# Patient Record
Sex: FEMALE | Race: WHITE | URBAN - METROPOLITAN AREA
[De-identification: names, ages, dates, MRNs, and addresses within clinical notes are randomized per-mention and may not be internally consistent; named-entity substitution may affect disease eponyms.]

---

## 2024-01-23 ENCOUNTER — OFFICE VISIT (OUTPATIENT)
Dept: OBGYN | Age: 39
End: 2024-01-23
Payer: COMMERCIAL

## 2024-01-23 ENCOUNTER — HOSPITAL ENCOUNTER (OUTPATIENT)
Age: 39
Setting detail: SPECIMEN
Discharge: HOME | End: 2024-01-23
Payer: COMMERCIAL

## 2024-01-23 VITALS
DIASTOLIC BLOOD PRESSURE: 89 MMHG | WEIGHT: 242 LBS | SYSTOLIC BLOOD PRESSURE: 137 MMHG | HEIGHT: 68 IN | BODY MASS INDEX: 36.68 KG/M2

## 2024-01-23 DIAGNOSIS — Z01.419 ENCOUNTER FOR ANNUAL ROUTINE GYNECOLOGICAL EXAMINATION: ICD-10-CM

## 2024-01-23 DIAGNOSIS — Z11.51 ENCOUNTER FOR SCREENING FOR HUMAN PAPILLOMAVIRUS (HPV): ICD-10-CM

## 2024-01-23 DIAGNOSIS — N92.6 IRREGULAR MENSES: Primary | ICD-10-CM

## 2024-01-23 DIAGNOSIS — L68.0 HIRSUTISM: ICD-10-CM

## 2024-01-23 LAB
DEPRECATED RDW RBC AUTO: 12.2 % (ref 12.4–15.4)
HCT VFR BLD AUTO: 44.8 % (ref 36–48)
HGB BLD-MCNC: 15.2 G/DL (ref 12–16)
MCH RBC QN AUTO: 29.2 PG (ref 26–34)
MCHC RBC AUTO-ENTMCNC: 34 G/DL (ref 31–36)
MCV RBC AUTO: 85.9 FL (ref 80–100)
PLATELET # BLD AUTO: 301 K/UL (ref 135–450)
PMV BLD AUTO: 10.2 FL (ref 5–10.5)
RBC # BLD AUTO: 5.21 M/UL (ref 4–5.2)
WBC # BLD AUTO: 9.4 K/UL (ref 4–11)

## 2024-01-23 PROCEDURE — 36415 COLL VENOUS BLD VENIPUNCTURE: CPT | Performed by: OBSTETRICS & GYNECOLOGY

## 2024-01-23 PROCEDURE — 88142 CYTOPATH C/V THIN LAYER: CPT

## 2024-01-23 PROCEDURE — 99385 PREV VISIT NEW AGE 18-39: CPT | Performed by: OBSTETRICS & GYNECOLOGY

## 2024-01-23 PROCEDURE — 87624 HPV HI-RISK TYP POOLED RSLT: CPT

## 2024-01-23 ASSESSMENT — PATIENT HEALTH QUESTIONNAIRE - PHQ9
SUM OF ALL RESPONSES TO PHQ9 QUESTIONS 1 & 2: 0
SUM OF ALL RESPONSES TO PHQ QUESTIONS 1-9: 0
SUM OF ALL RESPONSES TO PHQ QUESTIONS 1-9: 0
1. LITTLE INTEREST OR PLEASURE IN DOING THINGS: 0
SUM OF ALL RESPONSES TO PHQ QUESTIONS 1-9: 0
2. FEELING DOWN, DEPRESSED OR HOPELESS: 0
SUM OF ALL RESPONSES TO PHQ QUESTIONS 1-9: 0

## 2024-01-23 NOTE — PROGRESS NOTES
24    Justine La  1985    Chief Complaint   Patient presents with    New Patient     Annual exam, LMP 24, irregular menses, had tubal ligation, sexually active, pap 2012 negative.   Complains of irregular menses and hirsutism.      Menstrual Problem     Menses not every 28 days sometimes comes before sometimes later than 28 days.         Justine La is a 38 y.o. female who presents today for evaluation of annual exam, hirutism, irreggular menses    Past Medical History:   Diagnosis Date    Hirsutism     Hypertension     Irregular menses     Obesity        Past Surgical History:   Procedure Laterality Date     SECTION      TUBAL LIGATION         Social History     Tobacco Use    Smoking status: Never    Smokeless tobacco: Never   Vaping Use    Vaping Use: Never used   Substance Use Topics    Alcohol use: No    Drug use: No       Family History   Problem Relation Age of Onset    Arthritis Mother     Miscarriages / Stillbirths Mother     High Blood Pressure Father     Heart Disease Maternal Grandmother     High Blood Pressure Maternal Grandmother     High Cholesterol Maternal Grandmother     Heart Disease Maternal Grandfather     High Blood Pressure Maternal Grandfather     High Cholesterol Maternal Grandfather        Current Outpatient Medications   Medication Sig Dispense Refill    Multiple Vitamins-Minerals (MULTIVITAMIN & MINERAL PO) Take  by mouth.         No current facility-administered medications for this visit.       Allergies   Allergen Reactions    Augmentin [Amoxicillin-Pot Clavulanate]            There is no immunization history for the selected administration types on file for this patient.    Review of Systems    /89 (Site: Right Upper Arm, Position: Sitting, Cuff Size: Large Adult)   Ht 1.727 m (5' 8\")   Wt 109.8 kg (242 lb)   LMP 2024 Comment: bps  BMI 36.80 kg/m²     Physical Exam  Exam conducted with a chaperone present.   Constitutional:

## 2024-01-24 LAB
FSH SERPL-ACNC: 3.5 MIU/ML
HCG SERPL QL: NEGATIVE
PROLACTIN SERPL IA-MCNC: 10.7 NG/ML
TSH SERPL DL<=0.005 MIU/L-ACNC: 1.39 UIU/ML (ref 0.27–4.2)

## 2024-01-25 LAB
SHBG SERPL-SCNC: 50 NMOL/L (ref 30–135)
TESTOST FREE SERPL-MCNC: 9.2 PG/ML (ref 1.3–9.2)
TESTOST SERPL-MCNC: 66 NG/DL (ref 20–70)

## 2024-01-27 LAB
HPV HIGH RISK: NOT DETECTED
HPV, GENOTYPE 16: NOT DETECTED
HPV, GENOTYPE 18: NOT DETECTED

## 2024-02-08 ENCOUNTER — OFFICE VISIT (OUTPATIENT)
Dept: OBGYN | Age: 39
End: 2024-02-08
Payer: COMMERCIAL

## 2024-02-08 VITALS
BODY MASS INDEX: 36.22 KG/M2 | DIASTOLIC BLOOD PRESSURE: 83 MMHG | WEIGHT: 239 LBS | SYSTOLIC BLOOD PRESSURE: 136 MMHG | HEIGHT: 68 IN

## 2024-02-08 DIAGNOSIS — E28.2 PCOS (POLYCYSTIC OVARIAN SYNDROME): Primary | ICD-10-CM

## 2024-02-08 PROCEDURE — 99214 OFFICE O/P EST MOD 30 MIN: CPT | Performed by: OBSTETRICS & GYNECOLOGY

## 2024-02-08 RX ORDER — METFORMIN HYDROCHLORIDE 500 MG/1
1500 TABLET, EXTENDED RELEASE ORAL
Qty: 90 TABLET | Refills: 11 | Status: SHIPPED | OUTPATIENT
Start: 2024-02-08 | End: 2025-02-02

## 2024-02-08 RX ORDER — SPIRONOLACTONE 100 MG/1
100 TABLET, FILM COATED ORAL DAILY
Qty: 90 TABLET | Refills: 1 | Status: SHIPPED | OUTPATIENT
Start: 2024-02-08

## 2024-02-08 SDOH — ECONOMIC STABILITY: FOOD INSECURITY: WITHIN THE PAST 12 MONTHS, YOU WORRIED THAT YOUR FOOD WOULD RUN OUT BEFORE YOU GOT MONEY TO BUY MORE.: NEVER TRUE

## 2024-02-08 SDOH — ECONOMIC STABILITY: TRANSPORTATION INSECURITY
IN THE PAST 12 MONTHS, HAS LACK OF TRANSPORTATION KEPT YOU FROM MEETINGS, WORK, OR FROM GETTING THINGS NEEDED FOR DAILY LIVING?: NO

## 2024-02-08 SDOH — ECONOMIC STABILITY: INCOME INSECURITY: HOW HARD IS IT FOR YOU TO PAY FOR THE VERY BASICS LIKE FOOD, HOUSING, MEDICAL CARE, AND HEATING?: NOT HARD AT ALL

## 2024-02-08 SDOH — ECONOMIC STABILITY: FOOD INSECURITY: WITHIN THE PAST 12 MONTHS, THE FOOD YOU BOUGHT JUST DIDN'T LAST AND YOU DIDN'T HAVE MONEY TO GET MORE.: NEVER TRUE

## 2024-02-08 SDOH — ECONOMIC STABILITY: HOUSING INSECURITY
IN THE LAST 12 MONTHS, WAS THERE A TIME WHEN YOU DID NOT HAVE A STEADY PLACE TO SLEEP OR SLEPT IN A SHELTER (INCLUDING NOW)?: NO

## 2024-02-08 NOTE — PROGRESS NOTES
24    Justine La  1985    Chief Complaint   Patient presents with    Irregular Menses     Pt here to discuss ultrasound result for irregular menses.         Justine La is a 38 y.o. female who presents today for evaluation of irregular menses, hirsutism    Past Medical History:   Diagnosis Date    Hirsutism     Hypertension     Irregular menses     Obesity        Past Surgical History:   Procedure Laterality Date     SECTION      TUBAL LIGATION         Social History     Tobacco Use    Smoking status: Never    Smokeless tobacco: Never   Vaping Use    Vaping Use: Never used   Substance Use Topics    Alcohol use: No    Drug use: No       Family History   Problem Relation Age of Onset    Arthritis Mother     Miscarriages / Stillbirths Mother     High Blood Pressure Father     Heart Disease Maternal Grandmother     High Blood Pressure Maternal Grandmother     High Cholesterol Maternal Grandmother     Heart Disease Maternal Grandfather     High Blood Pressure Maternal Grandfather     High Cholesterol Maternal Grandfather        Current Outpatient Medications   Medication Sig Dispense Refill    spironolactone (ALDACTONE) 100 MG tablet Take 1 tablet by mouth daily 90 tablet 1    metFORMIN (GLUCOPHAGE-XR) 500 MG extended release tablet Take 3 tablets by mouth daily (with breakfast) 90 tablet 11    Multiple Vitamins-Minerals (MULTIVITAMIN & MINERAL PO) Take  by mouth.         No current facility-administered medications for this visit.       Allergies   Allergen Reactions    Augmentin [Amoxicillin-Pot Clavulanate]            There is no immunization history for the selected administration types on file for this patient.    Review of Systems  2024 1432 2024 0456 Prolactin [[id number]]   Blood    Component Value Units   Prolactin 10.7  ng/mL          2024 1432 2024 0255 TSH with Reflex to FT4 [[id number]]   Blood    Component Value Units   TSH Reflex FT4 1.39 uIU/mL

## 2024-08-18 DIAGNOSIS — E28.2 PCOS (POLYCYSTIC OVARIAN SYNDROME): ICD-10-CM

## 2024-08-19 RX ORDER — SPIRONOLACTONE 100 MG/1
100 TABLET, FILM COATED ORAL DAILY
Qty: 90 TABLET | Refills: 1 | Status: SHIPPED | OUTPATIENT
Start: 2024-08-19

## 2024-11-19 ENCOUNTER — HOSPITAL ENCOUNTER (OUTPATIENT)
Dept: PHYSICAL THERAPY | Age: 39
Setting detail: THERAPIES SERIES
Discharge: HOME | End: 2024-11-19
Payer: COMMERCIAL

## 2024-11-19 PROCEDURE — 97161 PT EVAL LOW COMPLEX 20 MIN: CPT

## 2024-11-19 PROCEDURE — 97110 THERAPEUTIC EXERCISES: CPT

## 2024-11-19 NOTE — PLAN OF CARE
Outpatient Physical Therapy           [place]           [x] Phone: [phone]   Fax: [phone]  [place]           [] Phone: [phone]   Fax: [phone]     To: Angela Guerrero PA     From: Yuridia Lombardi, PT, DPT, Cert. DN      Patient: Justine La       : 1985  Diagnosis: adhesive capsulitis of right shoulder   Treatment Diagnosis: decreased RUE ROM  Date: 2024    Physical Therapy Certification/Re-Certification Form  Dear KAYLI Mishra   The following patient has been evaluated for physical therapy services and for therapy to continue, insurance requires physician review of the treatment plan initially and every 90 days. Please review the attached evaluation and/or summary of the patient's plan of care, and verify that you agree therapy should continue by signing the attached document and sending it back to our office.    Assessment:    Assessment: Pt is a 40 yo L handed female that presents to therapy with complaints of arm stiffness and decreased ROM in RUE. She did have a flu shot about a week before experiencing her RUE s/s. She has been dealing with these issues for about 4 months. 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 She is also having a hard time with dressing and household tasks, requiring assistance from her family. Presenting s/s suggest adhesive capsulitis from improper flu shot administration. Pt demo impaired RUE ROM/strength, reaching, activity tolerance, functional mobility, work tolerance, ADLS/IADLS and increased pain. Pt would benefit from continued skilled physical therapy to address impairments and limitations, progress toward goal completion, promote independence with ADLs, and prevent further injury.    Patient agrees with established plan of care and assisted in the development of their short term and long term goals. Patient had no adverse reaction with initial treatment and there are no barriers to learning.

## 2024-11-19 NOTE — FLOWSHEET NOTE
Outpatient Physical Therapy  [place]           [x] Phone: [phone]   Fax: [phone]  [place]           [] Phone: [phone]   Fax: [phone]        Physical Therapy Daily Treatment Note  Date:  2024    Patient Name:  Justine La    :  1985  MRN: [id number]  Restrictions/Precautions: Restrictions/Precautions: General Precautions        Diagnosis:  adhesive capsulitis or right shoulder   Date of Injury/Surgery:   Treatment Diagnosis:  decreased RUE ROM  Insurance/Certification information: anthem- 90 visits  Referring Physician:  Angela Guerrero PA     PCP: Scott Fontaine MD  Next Doctor Visit:    Plan of care signed (Y/N):  sent   Outcome Measure: QD: 34%   Visit# / total visits:   1/10  Pain level: 0/10   Goals:     Patient goals: return to PLOF  Short term goals  Time Frame for Short term goals: refer to [facility]                 Long Term Goals  Time Frame for Long Term Goals: 10 visits  Pt will report overall improvement in condition by 50% or more  pt will improve QD to 25% or less to show MDC and subjective improvement for reduced pain with ADLS/IADLS  pt will improve R shoulder flexion AROM to 145 deg for improved reaching  pt will improve R shoulder IR AROM to L3 for improved dressing  pt will improve R shoulder ER AROM to C3 for improved brushing hair      Summary of Evaluation:  Assessment: Pt is a 38 yo L handed female that presents to therapy with complaints of arm stiffness and decreased ROM in RUE. She did have a flu shot about a week before experiencing her RUE s/s. She has been dealing with these issues for about 4 months. 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 She is also having a hard time with dressing and household tasks, requiring assistance from her family. Presenting s/s suggest adhesive capsulitis from improper flu shot administration. Pt demo impaired RUE ROM/strength, reaching, activity tolerance, functional mobility, work

## 2024-11-19 NOTE — PROGRESS NOTES
Physical Therapy: Initial Evaluation    Patient: Justine La (39 y.o. female)   Examination Date: 2024  Plan of Care Certification Period: 2024 to        :  1985 ;    Confirmed: Yes MRN: [id number]  CSN: [id number]   Insurance: Payor: [facility] / Plan: BCBS - [place] HMO / Product Type: *No Product type* /   Insurance ID: [id number] - ([facility]) Secondary Insurance (if applicable):    Referring Physician: Angela Guerrero PA     PCP: Scott Fontaine MD Visits to Date/Visits Approved:   /      No Show/Cancelled Appts:   /       Medical Diagnosis: adhesive capsulitis of right shoulder   Treatment Diagnosis: decreased RUE ROM     PERTINENT MEDICAL HISTORY   Patient Assessed for Rehabilitation Services: Yes       Medical History: Chart Reviewed: Yes   Past Medical History:   Diagnosis Date    Hirsutism     Hypertension     Irregular menses     Obesity      Surgical History:   Past Surgical History:   Procedure Laterality Date     SECTION      TUBAL LIGATION         Medications:   Current Outpatient Medications:     spironolactone (ALDACTONE) 100 MG tablet, TAKE 1 TABLET BY MOUTH EVERY DAY, Disp: 90 tablet, Rfl: 1    metFORMIN (GLUCOPHAGE-XR) 500 MG extended release tablet, Take 3 tablets by mouth daily (with breakfast), Disp: 90 tablet, Rfl: 11    Multiple Vitamins-Minerals (MULTIVITAMIN & MINERAL PO), Take  by mouth.  , Disp: , Rfl:   Allergies: Augmentin [amoxicillin-pot clavulanate]      SUBJECTIVE EXAMINATION      ,           Subjective History:    Subjective: Pt reports that she hasnt been able to lift her arm above her head for about 4 months. She notes that she wasnt able to get bra straps on and then she slept on it and it has been that way ever since. She denies any surgeries on her R shoulder. She did play volleyball in high school and thinks that she injured it previously. She is currently in 0/10 pain, denies any n/t. She has been having trouble dressing and completely household

## 2024-12-04 ENCOUNTER — HOSPITAL ENCOUNTER (OUTPATIENT)
Dept: PHYSICAL THERAPY | Age: 39
Setting detail: THERAPIES SERIES
Discharge: HOME | End: 2024-12-04
Payer: COMMERCIAL

## 2024-12-04 PROCEDURE — 97110 THERAPEUTIC EXERCISES: CPT

## 2024-12-04 PROCEDURE — 97140 MANUAL THERAPY 1/> REGIONS: CPT

## 2024-12-04 NOTE — FLOWSHEET NOTE
Outpatient Physical Therapy  [place]           [x] Phone: [phone]   Fax: [phone]  [place]           [] Phone: [phone]   Fax: [phone]        Physical Therapy Daily Treatment Note  Date:  2024    Patient Name:  Justine La    :  1985  MRN: [id number]  Restrictions/Precautions: Restrictions/Precautions: General Precautions        Diagnosis:  adhesive capsulitis or right shoulder   Date of Injury/Surgery:   Treatment Diagnosis:  decreased RUE ROM  Insurance/Certification information: anthem- 90 visits  Referring Physician:  Angela Guerrero PA     PCP: Scott Fontaine MD  Next Doctor Visit:    Plan of care signed (Y/N):  sent   Outcome Measure: QD: 34%   Visit# / total visits:   2/10  Pain level: 0/10   Goals:     Patient goals: return to PLOF  Short term goals  Time Frame for Short term goals: refer to [facility]                 Long Term Goals  Time Frame for Long Term Goals: 10 visits  Pt will report overall improvement in condition by 50% or more  pt will improve QD to 25% or less to show MDC and subjective improvement for reduced pain with ADLS/IADLS  pt will improve R shoulder flexion AROM to 145 deg for improved reaching  pt will improve R shoulder IR AROM to L3 for improved dressing  pt will improve R shoulder ER AROM to C3 for improved brushing hair      Summary of Evaluation:  Assessment: Pt is a 40 yo L handed female that presents to therapy with complaints of arm stiffness and decreased ROM in RUE. She did have a flu shot about a week before experiencing her RUE s/s. She has been dealing with these issues for about 4 months. 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 She is also having a hard time with dressing and household tasks, requiring assistance from her family. Presenting s/s suggest adhesive capsulitis from improper flu shot administration. Pt demo impaired RUE ROM/strength, reaching, activity tolerance, functional mobility, work

## 2024-12-10 ENCOUNTER — HOSPITAL ENCOUNTER (OUTPATIENT)
Dept: PHYSICAL THERAPY | Age: 39
Setting detail: THERAPIES SERIES
Discharge: HOME | End: 2024-12-10
Payer: COMMERCIAL

## 2024-12-10 PROCEDURE — 97110 THERAPEUTIC EXERCISES: CPT

## 2024-12-10 PROCEDURE — 97140 MANUAL THERAPY 1/> REGIONS: CPT

## 2024-12-10 NOTE — FLOWSHEET NOTE
Outpatient Physical Therapy  [place]           [x] Phone: [phone]   Fax: [phone]  [place]           [] Phone: [phone]   Fax: [phone]        Physical Therapy Daily Treatment Note  Date:  12/10/2024    Patient Name:  Justine La    :  1985  MRN: [id number]  Restrictions/Precautions: Restrictions/Precautions: General Precautions        Diagnosis:  adhesive capsulitis or right shoulder   Date of Injury/Surgery:   Treatment Diagnosis:  decreased RUE ROM  Insurance/Certification information: anthem- 90 visits  Referring Physician:  Angela Guerrero PA     PCP: Scott Fontaine MD  Next Doctor Visit:    Plan of care signed (Y/N):  sent   Outcome Measure: QD: 34%   Visit# / total visits:   3/10  Pain level: 0/10   Goals:     Patient goals: return to PLOF  Short term goals  Time Frame for Short term goals: refer to [facility]       Long Term Goals  Time Frame for Long Term Goals: 10 visits  Pt will report overall improvement in condition by 50% or more  pt will improve QD to 25% or less to show MDC and subjective improvement for reduced pain with ADLS/IADLS  pt will improve R shoulder flexion AROM to 145 deg for improved reaching  pt will improve R shoulder IR AROM to L3 for improved dressing  pt will improve R shoulder ER AROM to C3 for improved brushing hair      Summary of Evaluation:  Assessment: Pt is a 40 yo L handed female that presents to therapy with complaints of arm stiffness and decreased ROM in RUE. She did have a flu shot about a week before experiencing her RUE s/s. She has been dealing with these issues for about 4 months. 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 She is also having a hard time with dressing and household tasks, requiring assistance from her family. Presenting s/s suggest adhesive capsulitis from improper flu shot administration. Pt demo impaired RUE ROM/strength, reaching, activity tolerance, functional mobility, work

## 2024-12-17 ENCOUNTER — HOSPITAL ENCOUNTER (OUTPATIENT)
Dept: PHYSICAL THERAPY | Age: 39
Setting detail: THERAPIES SERIES
Discharge: HOME | End: 2024-12-17
Payer: COMMERCIAL

## 2024-12-17 PROCEDURE — 97140 MANUAL THERAPY 1/> REGIONS: CPT

## 2024-12-17 PROCEDURE — 97110 THERAPEUTIC EXERCISES: CPT

## 2024-12-17 NOTE — FLOWSHEET NOTE
AAROM with Dowel  - 2 x daily - 7 x weekly - 2 sets - 10 reps - 5 hold   - Supine Shoulder Abduction AAROM with Dowel  - 2 x daily - 7 x weekly - 2 sets - 10 reps - 5 hold   - Sidelying Shoulder External Rotation with Dumbbell  - 2 x daily - 7 x weekly - 2 sets - 10 reps   - Standing Shoulder Internal Rotation Stretch with Towel  - 2 x daily - 7 x weekly - 2 sets - 10 reps - 5 hold      12/10: R UT stretch     Manual Treatments:  TPR R UT , PROM in all directions to tolerance, scap mobilty x 20' total     Modalities:  none      Communication with other providers:  none this date       Assessment: Pt tolerated treatment well today.  Pt is making gains with her strength and ROM, but is still very limited with ER and IR. Pt rated pain at 0/10 after treatment and stated that she felt like she had more mobility.         Plan for Next Session:   Specific Instructions for Next Treatment: RUE ROM/strength, manual    Time In / Time Out:   1645/  1730     Timed Code/Total Treatment Minutes:  45'/45' 1 man ( 20') 2 TE (25')       Next Progress Note due:  10th visit       Plan of Care Interventions:  [x] Therapeutic Exercise  [x] Modalities:  [x] Therapeutic Activity     [] Ultrasound  [] Estim  [] Gait Training      [] Cervical Traction [] Lumbar Traction  [x] Neuromuscular Re-education    [] Cold/hotpack [] Iontophoresis   [x] Instruction in HEP      [x] Vasopneumatic   [x] Dry Needling    [x] Manual Therapy               [] Aquatic Therapy              Electronically signed by:Kalie Pierre PTA  , 12/17/2024, 1:05 PM     12/17/2024,6:19 PM

## 2024-12-30 ENCOUNTER — HOSPITAL ENCOUNTER (OUTPATIENT)
Dept: PHYSICAL THERAPY | Age: 39
Setting detail: THERAPIES SERIES
Discharge: HOME | End: 2024-12-30
Payer: COMMERCIAL

## 2024-12-30 PROCEDURE — 97140 MANUAL THERAPY 1/> REGIONS: CPT

## 2024-12-30 PROCEDURE — 97110 THERAPEUTIC EXERCISES: CPT

## 2024-12-30 NOTE — FLOWSHEET NOTE
Outpatient Physical Therapy  [place]           [x] Phone: [phone]   Fax: [phone]  [place]           [] Phone: [phone]   Fax: [phone]        Physical Therapy Daily Treatment Note  Date:  2024    Patient Name:  Justine La    :  1985  MRN: [id number]  Restrictions/Precautions: Restrictions/Precautions: General Precautions        Diagnosis:  adhesive capsulitis or right shoulder   Date of Injury/Surgery:   Treatment Diagnosis:  decreased RUE ROM  Insurance/Certification information: anthem- 90 visits  Referring Physician:  Angela Guerrero PA     PCP: Scott Fontaine MD  Next Doctor Visit:  mid    Plan of care signed (Y/N):  sent   Outcome Measure: QD: 34%   Visit# / total visits:   5/10  Pain level: 0/10   Goals:     Patient goals: return to PLOF  Short term goals  Time Frame for Short term goals: refer to LT       Long Term Goals  Time Frame for Long Term Goals: 10 visits  Pt will report overall improvement in condition by 50% or more  pt will improve QD to 25% or less to show MDC and subjective improvement for reduced pain with ADLS/IADLS  pt will improve R shoulder flexion AROM to 145 deg for improved reaching  pt will improve R shoulder IR AROM to L3 for improved dressing  pt will improve R shoulder ER AROM to C3 for improved brushing hair      Summary of Evaluation:  Assessment: Pt is a 38 yo L handed female that presents to therapy with complaints of arm stiffness and decreased ROM in RUE. She did have a flu shot about a week before experiencing her RUE s/s. She has been dealing with these issues for about 4 months. 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 She is also having a hard time with dressing and household tasks, requiring assistance from her family. Presenting s/s suggest adhesive capsulitis from improper flu shot administration. Pt demo impaired RUE ROM/strength, reaching, activity tolerance, functional mobility, work

## 2025-01-07 ENCOUNTER — HOSPITAL ENCOUNTER (OUTPATIENT)
Dept: PHYSICAL THERAPY | Age: 40
Setting detail: THERAPIES SERIES
Discharge: HOME | End: 2025-01-07
Payer: COMMERCIAL

## 2025-01-07 PROCEDURE — 97140 MANUAL THERAPY 1/> REGIONS: CPT

## 2025-01-07 PROCEDURE — 97110 THERAPEUTIC EXERCISES: CPT

## 2025-01-07 NOTE — FLOWSHEET NOTE
Outpatient Physical Therapy  [place]           [x] Phone: [phone]   Fax: [phone]  [place]           [] Phone: [phone]   Fax: [phone]        Physical Therapy Daily Treatment Note  Date:  2025    Patient Name:  Justine La    :  1985  MRN: [id number]  Restrictions/Precautions: Restrictions/Precautions: General Precautions        Diagnosis:  adhesive capsulitis or right shoulder   Date of Injury/Surgery:   Treatment Diagnosis:  decreased RUE ROM  Insurance/Certification information: anthem- 90 visits  Referring Physician:  Angela Guerrero PA     PCP: Scott Fontaine MD  Next Doctor Visit:  mid    Plan of care signed (Y/N):  sent   Outcome Measure: QD: 34%   Visit# / total visits:   6/10  Pain level: 3/10   Goals:     Patient goals: return to PLOF  Short term goals  Time Frame for Short term goals: refer to LT       Long Term Goals  Time Frame for Long Term Goals: 10 visits  Pt will report overall improvement in condition by 50% or more  pt will improve QD to 25% or less to show MDC and subjective improvement for reduced pain with ADLS/IADLS  pt will improve R shoulder flexion AROM to 145 deg for improved reaching  pt will improve R shoulder IR AROM to L3 for improved dressing  pt will improve R shoulder ER AROM to C3 for improved brushing hair      Summary of Evaluation:  Assessment: Pt is a 38 yo L handed female that presents to therapy with complaints of arm stiffness and decreased ROM in RUE. She did have a flu shot about a week before experiencing her RUE s/s. She has been dealing with these issues for about 4 months. 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 She is also having a hard time with dressing and household tasks, requiring assistance from her family. Presenting s/s suggest adhesive capsulitis from improper flu shot administration. Pt demo impaired RUE ROM/strength, reaching, activity tolerance, functional mobility, work

## 2025-01-15 ENCOUNTER — HOSPITAL ENCOUNTER (OUTPATIENT)
Dept: PHYSICAL THERAPY | Age: 40
Setting detail: THERAPIES SERIES
Discharge: HOME | End: 2025-01-15
Payer: COMMERCIAL

## 2025-01-15 PROCEDURE — 97110 THERAPEUTIC EXERCISES: CPT

## 2025-01-15 PROCEDURE — 97140 MANUAL THERAPY 1/> REGIONS: CPT

## 2025-01-21 ENCOUNTER — HOSPITAL ENCOUNTER (OUTPATIENT)
Dept: PHYSICAL THERAPY | Age: 40
Setting detail: THERAPIES SERIES
Discharge: HOME | End: 2025-01-21
Payer: COMMERCIAL

## 2025-01-21 PROCEDURE — 97110 THERAPEUTIC EXERCISES: CPT

## 2025-01-21 PROCEDURE — 97140 MANUAL THERAPY 1/> REGIONS: CPT

## 2025-01-21 NOTE — FLOWSHEET NOTE
Outpatient Physical Therapy  [place]           [x] Phone: [phone]   Fax: [phone]  [place]           [] Phone: [phone]   Fax: [phone]        Physical Therapy Daily Treatment Note  Date:  2025    Patient Name:  Justine La    :  1985  MRN: [id number]  Restrictions/Precautions: Restrictions/Precautions: General Precautions        Diagnosis:  adhesive capsulitis or right shoulder   Date of Injury/Surgery:   Treatment Diagnosis:  decreased RUE ROM  Insurance/Certification information: anthem- 90 visits  Referring Physician:  Angela Guerrero PA     PCP: Scott Fontaine MD  Next Doctor Visit:  mid    Plan of care signed (Y/N):  sent   Outcome Measure: QD: 34%   Visit# / total visits:   8/10  Pain level: 2/10   Goals:     Patient goals: return to PLOF  Short term goals  Time Frame for Short term goals: refer to LT       Long Term Goals  Time Frame for Long Term Goals: 10 visits  Pt will report overall improvement in condition by 50% or more  pt will improve QD to 25% or less to show MDC and subjective improvement for reduced pain with ADLS/IADLS  pt will improve R shoulder flexion AROM to 145 deg for improved reaching  pt will improve R shoulder IR AROM to L3 for improved dressing  pt will improve R shoulder ER AROM to C3 for improved brushing hair      Summary of Evaluation:  Assessment: Pt is a 38 yo L handed female that presents to therapy with complaints of arm stiffness and decreased ROM in RUE. She did have a flu shot about a week before experiencing her RUE s/s. She has been dealing with these issues for about 4 months. 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 She is also having a hard time with dressing and household tasks, requiring assistance from her family. Presenting s/s suggest adhesive capsulitis from improper flu shot administration. Pt demo impaired RUE ROM/strength, reaching, activity tolerance, functional mobility, work

## 2025-01-28 ENCOUNTER — HOSPITAL ENCOUNTER (OUTPATIENT)
Dept: PHYSICAL THERAPY | Age: 40
Setting detail: THERAPIES SERIES
Discharge: HOME | End: 2025-01-28
Payer: COMMERCIAL

## 2025-01-28 PROCEDURE — 97110 THERAPEUTIC EXERCISES: CPT

## 2025-01-28 PROCEDURE — 97140 MANUAL THERAPY 1/> REGIONS: CPT

## 2025-01-28 NOTE — FLOWSHEET NOTE
Outpatient Physical Therapy  [place]           [x] Phone: [phone]   Fax: [phone]  [place]           [] Phone: [phone]   Fax: [phone]        Physical Therapy Daily Treatment Note  Date:  2025    Patient Name:  Justine La    :  1985  MRN: [id number]  Restrictions/Precautions: Restrictions/Precautions: General Precautions        Diagnosis:  adhesive capsulitis or right shoulder   Date of Injury/Surgery:   Treatment Diagnosis:  decreased RUE ROM  Insurance/Certification information: anthem- 90 visits  Referring Physician:  Angela Guerrero PA     PCP: Scott Fontaine MD  Next Doctor Visit:  mid    Plan of care signed (Y/N):  sent   Outcome Measure: QD: 34%   Visit# / total visits:   9/10  Pain level: 2-3/10   Goals:     Patient goals: return to PLOF  Short term goals  Time Frame for Short term goals: refer to LT       Long Term Goals  Time Frame for Long Term Goals: 10 visits  Pt will report overall improvement in condition by 50% or more  pt will improve QD to 25% or less to show MDC and subjective improvement for reduced pain with ADLS/IADLS  pt will improve R shoulder flexion AROM to 145 deg for improved reaching  pt will improve R shoulder IR AROM to L3 for improved dressing  pt will improve R shoulder ER AROM to C3 for improved brushing hair      Summary of Evaluation:  Assessment: Pt is a 40 yo L handed female that presents to therapy with complaints of arm stiffness and decreased ROM in RUE. She did have a flu shot about a week before experiencing her RUE s/s. She has been dealing with these issues for about 4 months. 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 She is also having a hard time with dressing and household tasks, requiring assistance from her family. Presenting s/s suggest adhesive capsulitis from improper flu shot administration. Pt demo impaired RUE ROM/strength, reaching, activity tolerance, functional mobility, work

## 2025-02-04 ENCOUNTER — HOSPITAL ENCOUNTER (OUTPATIENT)
Dept: PHYSICAL THERAPY | Age: 40
Setting detail: THERAPIES SERIES
Discharge: HOME | End: 2025-02-04
Payer: COMMERCIAL

## 2025-02-04 PROCEDURE — 97110 THERAPEUTIC EXERCISES: CPT

## 2025-02-04 NOTE — PROGRESS NOTES
Outpatient Physical Therapy           [place]           [x] Phone: [phone]   Fax: [phone]  [place]           [] Phone: [phone]   Fax: [phone]      To: Angela Guerrero PA     From: Yuridia Lombardi, PT, DPT, Cert. DN       Patient: Justine La                    : 1985  Diagnosis: adhesive capsulitis of right shoulder   Treatment Diagnosis: decreased RUE ROM  Date: 2025  [x]  Progress Note                []  Discharge Note    Evaluation Date:  24   Total Visits to date:   10 Cancels/No-shows to date:  0    Subjective:    pt reports that she hasn't felt any pain recently but has been stiff lately. She notes that she has improved a lot with therapy, she isnt having as much pain and her range in some areas has improved but still needs some more work with other ranges. She would like to do some more therapy so that she can gain some more range. QD: 18%     Objective/Significant Findings At Last Visit/Comments:    R shoulder flexion AROM: 130 deg   R shoulder IR AROM: to sacrum   R shoulder ER AROM: to C4     Assessment:     Pt tolerated treatment well today.  Justine has been doing well with therapy. Her strength, ROM and pain has improved. She could benefit from continued ROM and strength of RUE for improved ADLS and IADLS. Pt would benefit from continued skilled physical therapy to address remaining impairments and limitations, progress toward goal completion, promote independence with ADLs, and prevent further injury     Goal Status:  [x] Achieved [x] Partially Achieved  [x] Not Achieved   Patient goals: return to PLOF progressing   Short term goals  Time Frame for Short term goals: refer to [facility]        Long Term Goals  Time Frame for Long Term Goals: 10 visits  Pt will report overall improvement in condition by 50% or more does not rate  pt will improve QD to 25% or less to show MDC and subjective improvement for reduced pain with ADLS/IADLS met   pt will improve R

## 2025-02-04 NOTE — FLOWSHEET NOTE
Electronically signed by:Yuridia Lombardi PT ,DPT, Cert. DN          2/4/2025,8:14 AM

## 2025-02-18 ENCOUNTER — HOSPITAL ENCOUNTER (OUTPATIENT)
Dept: PHYSICAL THERAPY | Age: 40
Setting detail: THERAPIES SERIES
Discharge: HOME | End: 2025-02-18
Payer: COMMERCIAL

## 2025-02-18 PROCEDURE — 97140 MANUAL THERAPY 1/> REGIONS: CPT

## 2025-02-18 PROCEDURE — 97110 THERAPEUTIC EXERCISES: CPT

## 2025-02-18 NOTE — FLOWSHEET NOTE
Outpatient Physical Therapy  [place]           [x] Phone: [phone]   Fax: [phone]  [place]           [] Phone: [phone]   Fax: [phone]        Physical Therapy Daily Treatment Note  Date:  2025    Patient Name:  Justine La    :  1985  MRN: [id number]  Restrictions/Precautions: Restrictions/Precautions: General Precautions        Diagnosis:  adhesive capsulitis or right shoulder   Date of Injury/Surgery:   Treatment Diagnosis:  decreased RUE ROM  Insurance/Certification information: anthem- 90 visits  Referring Physician:  Angela Guerrero PA     PCP: Scott Fontaine MD  Next Doctor Visit:    Plan of care signed (Y/N):  sent   Outcome Measure: QD: 18%   Visit# / total visits:     Pain level: 0/10   Goals:     Patient goals: return to PLOF progressing   Short term goals  Time Frame for Short term goals: refer to LT       Long Term Goals  Time Frame for Long Term Goals: 10 visits  Pt will report overall improvement in condition by 50% or more does not rate  pt will improve QD to 25% or less to show MDC and subjective improvement for reduced pain with ADLS/IADLS met   pt will improve R shoulder flexion AROM to 145 deg for improved reaching not met   pt will improve R shoulder IR AROM to L3 for improved dressing not met   pt will improve R shoulder ER AROM to C3 for improved brushing hair met       Summary of Evaluation:  Assessment: Pt is a 38 yo L handed female that presents to therapy with complaints of arm stiffness and decreased ROM in RUE. She did have a flu shot about a week before experiencing her RUE s/s. She has been dealing with these issues for about 4 months. 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 She is also having a hard time with dressing and household tasks, requiring assistance from her family. Presenting s/s suggest adhesive capsulitis from improper flu shot administration. Pt demo impaired RUE ROM/strength, reaching,

## 2025-02-25 ENCOUNTER — HOSPITAL ENCOUNTER (OUTPATIENT)
Dept: PHYSICAL THERAPY | Age: 40
Setting detail: THERAPIES SERIES
Discharge: HOME | End: 2025-02-25
Payer: COMMERCIAL

## 2025-02-25 PROCEDURE — 97110 THERAPEUTIC EXERCISES: CPT

## 2025-02-25 PROCEDURE — 97140 MANUAL THERAPY 1/> REGIONS: CPT

## 2025-02-25 NOTE — FLOWSHEET NOTE
Outpatient Physical Therapy  [place]           [x] Phone: [phone]   Fax: [phone]  [place]           [] Phone: [phone]   Fax: [phone]        Physical Therapy Daily Treatment Note  Date:  2025    Patient Name:  Justine La    :  1985  MRN: [id number]  Restrictions/Precautions: Restrictions/Precautions: General Precautions        Diagnosis:  adhesive capsulitis or right shoulder   Date of Injury/Surgery:   Treatment Diagnosis:  decreased RUE ROM  Insurance/Certification information: anthem- 90 visits  Referring Physician:  Angela Guerrero PA     PCP: Scott Fontaine MD  Next Doctor Visit:    Plan of care signed (Y/N):  sent   Outcome Measure: QD: 18%   Visit# / total visits:     Pain level: 1/10   Goals:     Patient goals: return to PLOF progressing   Short term goals  Time Frame for Short term goals: refer to LT       Long Term Goals  Time Frame for Long Term Goals: 10 visits  Pt will report overall improvement in condition by 50% or more does not rate  pt will improve QD to 25% or less to show MDC and subjective improvement for reduced pain with ADLS/IADLS met   pt will improve R shoulder flexion AROM to 145 deg for improved reaching not met   pt will improve R shoulder IR AROM to L3 for improved dressing not met   pt will improve R shoulder ER AROM to C3 for improved brushing hair met       Summary of Evaluation:  Assessment: Pt is a 40 yo L handed female that presents to therapy with complaints of arm stiffness and decreased ROM in RUE. She did have a flu shot about a week before experiencing her RUE s/s. She has been dealing with these issues for about 4 months. 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 She is also having a hard time with dressing and household tasks, requiring assistance from her family. Presenting s/s suggest adhesive capsulitis from improper flu shot administration. Pt demo impaired RUE ROM/strength, reaching,

## 2025-03-04 ENCOUNTER — HOSPITAL ENCOUNTER (OUTPATIENT)
Dept: PHYSICAL THERAPY | Age: 40
Setting detail: THERAPIES SERIES
Discharge: HOME | End: 2025-03-04
Payer: COMMERCIAL

## 2025-03-04 PROCEDURE — 97110 THERAPEUTIC EXERCISES: CPT

## 2025-03-04 PROCEDURE — 97140 MANUAL THERAPY 1/> REGIONS: CPT

## 2025-03-04 PROCEDURE — 97016 VASOPNEUMATIC DEVICE THERAPY: CPT

## 2025-03-04 NOTE — FLOWSHEET NOTE
Outpatient Physical Therapy  [place]           [x] Phone: [phone]   Fax: [phone]  [place]           [] Phone: [phone]   Fax: [phone]        Physical Therapy Daily Treatment Note  Date:  3/4/2025    Patient Name:  Justine La    :  1985  MRN: [id number]  Restrictions/Precautions: Restrictions/Precautions: General Precautions        Diagnosis:  adhesive capsulitis or right shoulder   Date of Injury/Surgery:   Treatment Diagnosis:  decreased RUE ROM  Insurance/Certification information: anthem- 90 visits  Referring Physician:  Angela Guerrero PA     PCP: Scott Fontaine MD  Next Doctor Visit:    Plan of care signed (Y/N):  sent   Outcome Measure: QD: 18%   Visit# / total visits:     Pain level: 2/10   Goals:     Patient goals: return to PLOF progressing   Short term goals  Time Frame for Short term goals: refer to LT       Long Term Goals  Time Frame for Long Term Goals: 10 visits  Pt will report overall improvement in condition by 50% or more does not rate  pt will improve QD to 25% or less to show MDC and subjective improvement for reduced pain with ADLS/IADLS met   pt will improve R shoulder flexion AROM to 145 deg for improved reaching not met   pt will improve R shoulder IR AROM to L3 for improved dressing not met   pt will improve R shoulder ER AROM to C3 for improved brushing hair met       Summary of Evaluation:  Assessment: Pt is a 38 yo L handed female that presents to therapy with complaints of arm stiffness and decreased ROM in RUE. She did have a flu shot about a week before experiencing her RUE s/s. She has been dealing with these issues for about 4 months. 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 She is also having a hard time with dressing and household tasks, requiring assistance from her family. Presenting s/s suggest adhesive capsulitis from improper flu shot administration. Pt demo impaired RUE ROM/strength, reaching,

## 2025-03-11 ENCOUNTER — HOSPITAL ENCOUNTER (OUTPATIENT)
Dept: PHYSICAL THERAPY | Age: 40
Setting detail: THERAPIES SERIES
Discharge: HOME | End: 2025-03-11
Payer: COMMERCIAL

## 2025-03-11 PROCEDURE — 97110 THERAPEUTIC EXERCISES: CPT

## 2025-03-11 PROCEDURE — 97140 MANUAL THERAPY 1/> REGIONS: CPT

## 2025-03-11 PROCEDURE — 97016 VASOPNEUMATIC DEVICE THERAPY: CPT

## 2025-03-11 NOTE — FLOWSHEET NOTE
Outpatient Physical Therapy  [place]           [x] Phone: [phone]   Fax: [phone]  [place]           [] Phone: [phone]   Fax: [phone]        Physical Therapy Daily Treatment Note  Date:  3/11/2025    Patient Name:  Justine La    :  1985  MRN: [id number]  Restrictions/Precautions: Restrictions/Precautions: General Precautions        Diagnosis:  adhesive capsulitis or right shoulder   Date of Injury/Surgery:   Treatment Diagnosis:  decreased RUE ROM  Insurance/Certification information: anthem- 90 visits  Referring Physician:  Angela Guerrero PA     PCP: Scott Fontaine MD  Next Doctor Visit:    Plan of care signed (Y/N):  sent   Outcome Measure: QD: 18%   Visit# / total visits:     Pain level: 0/10 pain     6-7/10 stiffness   Goals:     Patient goals: return to PLOF progressing   Short term goals  Time Frame for Short term goals: refer to [facility]       Long Term Goals  Time Frame for Long Term Goals: 10 visits  Pt will report overall improvement in condition by 50% or more does not rate  pt will improve QD to 25% or less to show MDC and subjective improvement for reduced pain with ADLS/IADLS met   pt will improve R shoulder flexion AROM to 145 deg for improved reaching not met   pt will improve R shoulder IR AROM to L3 for improved dressing not met   pt will improve R shoulder ER AROM to C3 for improved brushing hair met       Summary of Evaluation:  Assessment: Pt is a 38 yo L handed female that presents to therapy with complaints of arm stiffness and decreased ROM in RUE. She did have a flu shot about a week before experiencing her RUE s/s. She has been dealing with these issues for about 4 months. 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 She is also having a hard time with dressing and household tasks, requiring assistance from her family. Presenting s/s suggest adhesive capsulitis from improper flu shot administration. Pt demo impaired

## 2025-03-18 ENCOUNTER — HOSPITAL ENCOUNTER (OUTPATIENT)
Dept: PHYSICAL THERAPY | Age: 40
Setting detail: THERAPIES SERIES
Discharge: HOME | End: 2025-03-18
Payer: COMMERCIAL

## 2025-03-18 PROCEDURE — 97016 VASOPNEUMATIC DEVICE THERAPY: CPT

## 2025-03-18 PROCEDURE — 97110 THERAPEUTIC EXERCISES: CPT

## 2025-03-18 NOTE — FLOWSHEET NOTE
Care Interventions:  [x] Therapeutic Exercise  [x] Modalities:  [x] Therapeutic Activity     [] Ultrasound  [] Estim  [] Gait Training      [] Cervical Traction [] Lumbar Traction  [x] Neuromuscular Re-education    [] Cold/hotpack [] Iontophoresis   [x] Instruction in HEP      [x] Vasopneumatic   [x] Dry Needling    [x] Manual Therapy               [] Aquatic Therapy              Electronically signed by:Yuridia Lombardi PT ,DPT, Cert. DN         3/18/2025,8:28 AM

## 2025-03-18 NOTE — PROGRESS NOTES
pt will improve R shoulder IR AROM to L3 for improved dressing met   pt will improve R shoulder ER AROM to C3 for improved brushing hair met   Pt will be able to lift 5# overhead RUE for improved getting things out of cabinets new         Patient Status: [] Continue per initial plan of Care     [] Patient now discharged     [x] Additional visits requested, Please re-certify for additional visits:      Requested frequency/duration:  1 /week for 4 weeks    If we are requesting more visits, we fully anticipate the patient's condition is expected to improve within the treatment timeframe we are requesting.    Electronically signed by:  Yuridia Lombardi, PT, DPT, Cert. DN    3/18/2025, 8:30 AM    If you have any questions or concerns, please don't hesitate to call.  Thank you for your referral.    Physician Signature:______________________ Date:______ Time: ________  By signing above, therapist’s plan is approved by physician

## 2025-03-20 NOTE — PLAN OF CARE
Patients Plan of Care was received and signed. Signed POC was scanned and placed in the patients chart.    Elva Campbell

## 2025-04-01 ENCOUNTER — HOSPITAL ENCOUNTER (OUTPATIENT)
Dept: PHYSICAL THERAPY | Age: 40
Setting detail: THERAPIES SERIES
Discharge: HOME | End: 2025-04-01
Payer: COMMERCIAL

## 2025-04-01 PROCEDURE — 97140 MANUAL THERAPY 1/> REGIONS: CPT

## 2025-04-01 PROCEDURE — 97110 THERAPEUTIC EXERCISES: CPT

## 2025-04-01 NOTE — FLOWSHEET NOTE
Outpatient Physical Therapy  [place]           [x] Phone: [phone]   Fax: [phone]  [place]           [] Phone: [phone]   Fax: [phone]        Physical Therapy Daily Treatment Note  Date:  2025    Patient Name:  Justine La    :  1985  MRN: [id number]  Restrictions/Precautions: Restrictions/Precautions: General Precautions        Diagnosis:  adhesive capsulitis or right shoulder   Date of Injury/Surgery:   Treatment Diagnosis:  decreased RUE ROM  Insurance/Certification information: anthem- 90 visits  Referring Physician:  Angela Guerrero PA     PCP: Scott Fontaine MD  Next Doctor Visit:    Plan of care signed (Y/N):  sent   Outcome Measure: QD: 13%   Visit# / total visits:     Pain level: 2/10    Goals:     Patient goals: return to PLOF progressing   Short term goals  Time Frame for Short term goals: refer to LT       Long Term Goals  Time Frame for Long Term Goals: 10 visits  Pt will report overall improvement in condition by 50% or more met  pt will improve QD to 25% or less to show MDC and subjective improvement for reduced pain with ADLS/IADLS met   pt will improve R shoulder flexion AROM to 145 deg for improved reaching almost met   pt will improve R shoulder IR AROM to L3 for improved dressing met   pt will improve R shoulder ER AROM to C3 for improved brushing hair met   Pt will be able to lift 5# overhead RUE for improved getting things out of cabinets new       Summary of Evaluation:  Assessment: Pt is a 40 yo L handed female that presents to therapy with complaints of arm stiffness and decreased ROM in RUE. She did have a flu shot about a week before experiencing her RUE s/s. She has been dealing with these issues for about 4 months. 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 She is also having a hard time with dressing and household tasks, requiring assistance from her family. Presenting s/s suggest adhesive capsulitis from

## 2025-04-08 ENCOUNTER — HOSPITAL ENCOUNTER (OUTPATIENT)
Dept: PHYSICAL THERAPY | Age: 40
Setting detail: THERAPIES SERIES
Discharge: HOME | End: 2025-04-08
Payer: COMMERCIAL

## 2025-04-08 PROCEDURE — 97530 THERAPEUTIC ACTIVITIES: CPT

## 2025-04-08 PROCEDURE — 97110 THERAPEUTIC EXERCISES: CPT

## 2025-04-08 PROCEDURE — 97140 MANUAL THERAPY 1/> REGIONS: CPT

## 2025-04-08 PROCEDURE — 97016 VASOPNEUMATIC DEVICE THERAPY: CPT

## 2025-04-08 NOTE — FLOWSHEET NOTE
Outpatient Physical Therapy  [place]           [x] Phone: [phone]   Fax: [phone]  [place]           [] Phone: [phone]   Fax: [phone]        Physical Therapy Daily Treatment Note  Date:  2025    Patient Name:  Justine La    :  1985  MRN: [id number]  Restrictions/Precautions: Restrictions/Precautions: General Precautions        Diagnosis:  adhesive capsulitis or right shoulder   Date of Injury/Surgery:   Treatment Diagnosis:  decreased RUE ROM  Insurance/Certification information: anthem- 90 visits  Referring Physician:  Angela Guerrero PA     PCP: Scott Fontaine MD  Next Doctor Visit:    Plan of care signed (Y/N):  sent   Outcome Measure: QD: 13%   Visit# / total visits:     Pain level: 1/10    Goals:     Patient goals: return to PLOF progressing   Short term goals  Time Frame for Short term goals: refer to LT       Long Term Goals  Time Frame for Long Term Goals: 10 visits  Pt will report overall improvement in condition by 50% or more met  pt will improve QD to 25% or less to show MDC and subjective improvement for reduced pain with ADLS/IADLS met   pt will improve R shoulder flexion AROM to 145 deg for improved reaching almost met   pt will improve R shoulder IR AROM to L3 for improved dressing met   pt will improve R shoulder ER AROM to C3 for improved brushing hair met   Pt will be able to lift 5# overhead RUE for improved getting things out of cabinets new       Summary of Evaluation:  Assessment: Pt is a 40 yo L handed female that presents to therapy with complaints of arm stiffness and decreased ROM in RUE. She did have a flu shot about a week before experiencing her RUE s/s. She has been dealing with these issues for about 4 months. 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 She is also having a hard time with dressing and household tasks, requiring assistance from her family. Presenting s/s suggest adhesive capsulitis from

## 2025-04-15 ENCOUNTER — HOSPITAL ENCOUNTER (OUTPATIENT)
Dept: PHYSICAL THERAPY | Age: 40
Setting detail: THERAPIES SERIES
Discharge: HOME | End: 2025-04-15
Payer: COMMERCIAL

## 2025-04-15 PROCEDURE — 97016 VASOPNEUMATIC DEVICE THERAPY: CPT

## 2025-04-15 PROCEDURE — 97110 THERAPEUTIC EXERCISES: CPT

## 2025-04-15 PROCEDURE — 97140 MANUAL THERAPY 1/> REGIONS: CPT

## 2025-04-15 NOTE — FLOWSHEET NOTE
Outpatient Physical Therapy  [place]           [x] Phone: [phone]   Fax: [phone]  [place]           [] Phone: [phone]   Fax: [phone]        Physical Therapy Daily Treatment Note  Date:  4/15/2025    Patient Name:  Justine La    :  1985  MRN: [id number]  Restrictions/Precautions: Restrictions/Precautions: General Precautions        Diagnosis:  adhesive capsulitis or right shoulder   Date of Injury/Surgery:   Treatment Diagnosis:  decreased RUE ROM  Insurance/Certification information: anthem- 90 visits  Referring Physician:  Angela Guerrero PA     PCP: Soctt Fontaine MD  Next Doctor Visit:    Plan of care signed (Y/N):  sent   Outcome Measure: QD: 13%   Visit# / total visits:     Pain level: 1-2/10    Goals:     Patient goals: return to PLOF progressing   Short term goals  Time Frame for Short term goals: refer to [facility]       Long Term Goals  Time Frame for Long Term Goals: 10 visits  Pt will report overall improvement in condition by 50% or more met  pt will improve QD to 25% or less to show MDC and subjective improvement for reduced pain with ADLS/IADLS met   pt will improve R shoulder flexion AROM to 145 deg for improved reaching almost met   pt will improve R shoulder IR AROM to L3 for improved dressing met   pt will improve R shoulder ER AROM to C3 for improved brushing hair met   Pt will be able to lift 5# overhead RUE for improved getting things out of cabinets new       Summary of Evaluation:  Assessment: Pt is a 40 yo L handed female that presents to therapy with complaints of arm stiffness and decreased ROM in RUE. She did have a flu shot about a week before experiencing her RUE s/s. She has been dealing with these issues for about 4 months. 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 She is also having a hard time with dressing and household tasks, requiring assistance from her family. Presenting s/s suggest adhesive capsulitis

## 2025-04-22 ENCOUNTER — HOSPITAL ENCOUNTER (OUTPATIENT)
Dept: PHYSICAL THERAPY | Age: 40
Setting detail: THERAPIES SERIES
Discharge: HOME | End: 2025-04-22
Payer: COMMERCIAL

## 2025-04-22 PROCEDURE — 97140 MANUAL THERAPY 1/> REGIONS: CPT

## 2025-04-22 PROCEDURE — 97110 THERAPEUTIC EXERCISES: CPT

## 2025-04-22 NOTE — FLOWSHEET NOTE
Outpatient Physical Therapy  [place]           [x] Phone: [phone]   Fax: [phone]  [place]           [] Phone: [phone]   Fax: [phone]        Physical Therapy Daily Treatment Note  Date:  2025    Patient Name:  Justine La    :  1985  MRN: [id number]  Restrictions/Precautions: Restrictions/Precautions: General Precautions        Diagnosis:  adhesive capsulitis or right shoulder   Date of Injury/Surgery:   Treatment Diagnosis:  decreased RUE ROM  Insurance/Certification information: anthem- 90 visits  Referring Physician:  Angela Guerrero PA     PCP: Scott Fontaine MD  Next Doctor Visit:    Plan of care signed (Y/N):  sent   Outcome Measure: QD: 13%   Visit# / total visits:     Pain level: 0/10    Goals:     Patient goals: return to PLOF progressing   Short term goals  Time Frame for Short term goals: refer to LT       Long Term Goals  Time Frame for Long Term Goals: 10 visits  Pt will report overall improvement in condition by 50% or more met  pt will improve QD to 25% or less to show MDC and subjective improvement for reduced pain with ADLS/IADLS met   pt will improve R shoulder flexion AROM to 145 deg for improved reaching met  pt will improve R shoulder IR AROM to L3 for improved dressing met   pt will improve R shoulder ER AROM to C3 for improved brushing hair met   Pt will be able to lift 5# overhead RUE for improved getting things out of cabinets met       Summary of Evaluation:  Assessment: Pt is a 40 yo L handed female that presents to therapy with complaints of arm stiffness and decreased ROM in RUE. She did have a flu shot about a week before experiencing her RUE s/s. She has been dealing with these issues for about 4 months. 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 She is also having a hard time with dressing and household tasks, requiring assistance from her family. Presenting s/s suggest adhesive capsulitis from

## 2025-04-22 NOTE — DISCHARGE SUMMARY
Outpatient Physical Therapy           [place]           [x] Phone: [phone]   Fax: [phone]  [place]           [] Phone: [phone]   Fax: [phone]      To: Angela Guerrero PA     From: Yuridia Lombardi, PT, DPT, Cert. DN       Patient: Justine La                    : 1985  Diagnosis: adhesive capsulitis of right shoulder   Treatment Diagnosis: decreased RUE ROM  Date: 2025  []  Progress Note                [x]  Discharge Note    Evaluation Date:  24   Total Visits to date:   19 Cancels/No-shows to date:  0    Subjective:     pt reports no pain today, just a little stiffness but she notes that it is not as stiff as last week when she came to therapy. She reports that she would like today to be her last day of therapy, she feels a lot better since the first day of therapy.     Objective/Significant Findings At Last Visit/Comments:    R shoulder flexion AROM: 155 deg   Able to lift 5# overhead with slow arc of motion, no pain just slightly difficult   Trigger point R UT     Assessment:     Pt has shown good progress since therapy start regarding improved ROM, strength, reaching tolerance, activity tolerance, functional mobility and pain. Pt has met most of her goals at this time and is no longer having any major limitations outside of therapy.  PT educated pt on continuation of HEP after discharge for max benefits and reduced risk for future decline. Pt discharged this date.     Goal Status:  [x] Achieved [x] Partially Achieved  [] Not Achieved   Patient goals: return to PLOF progressing   Short term goals  Time Frame for Short term goals: refer to [facility]        Long Term Goals  Time Frame for Long Term Goals: 10 visits  Pt will report overall improvement in condition by 50% or more met  pt will improve QD to 25% or less to show MDC and subjective improvement for reduced pain with ADLS/IADLS met   pt will improve R shoulder flexion AROM to 145 deg for improved reaching